# Patient Record
Sex: FEMALE | Race: WHITE
[De-identification: names, ages, dates, MRNs, and addresses within clinical notes are randomized per-mention and may not be internally consistent; named-entity substitution may affect disease eponyms.]

---

## 2019-02-12 ENCOUNTER — HOSPITAL ENCOUNTER (OUTPATIENT)
Dept: HOSPITAL 75 - RAD FS | Age: 69
End: 2019-02-12
Attending: NURSE PRACTITIONER
Payer: MEDICARE

## 2019-02-12 DIAGNOSIS — R10.30: Primary | ICD-10-CM

## 2019-02-12 PROCEDURE — 74176 CT ABD & PELVIS W/O CONTRAST: CPT

## 2019-02-12 NOTE — DIAGNOSTIC IMAGING REPORT
PROCEDURE: CT urinary tract, rule out kidney stone.



TECHNIQUE: Multiple contiguous axial images were obtained through

the abdomen and pelvis without the use of intravenous contrast.



INDICATION:  Lower abdominal pain for three days.



COMPARISON: No prior studies are available for comparison.



FINDINGS: The lung bases are clear. No discrete liver mass is

identified. The gallbladder is unremarkable. No biliary duct

dilatation is seen. The pancreas and spleen are unremarkable. No

adrenal mass is detected. There appears to be a mass involving

the midportion of the left kidney extending exophytically

laterally. This measures approximately 3.6 cm AP x 3.3 cm

cephalocaudal. This is isodense to the renal cortex. No calculi

are seen. There is no hydronephrosis. Aorta is calcified but

non-aneurysmal. No central retroperitoneal or mesenteric

lymphadenopathy is detected. Small and large bowel loops are

normal caliber. There is no ascites apart from small amount of

free fluid in the pelvis surrounding sigmoid bowel loops. There

does appear to be some adjacent inflammatory stranding in the

fat. This may be secondary to diverticulitis. Visualized bladder

is unremarkable.



IMPRESSION:

1. Findings suggestive of acute sigmoid diverticulitis. No

definite abscess formation or bowel obstruction is seen. There is

some free fluid in the pelvis. 

2. Findings suspicious for solid left renal mass. Further

evaluation with renal ultrasound is recommended for better

characterization.









Dictated by: 



  Dictated on workstation # [id number]

## 2019-03-07 ENCOUNTER — HOSPITAL ENCOUNTER (OUTPATIENT)
Dept: HOSPITAL 75 - RAD FS | Age: 69
End: 2019-03-07
Attending: INTERNAL MEDICINE
Payer: MEDICARE

## 2019-03-07 DIAGNOSIS — N28.1: Primary | ICD-10-CM

## 2019-03-07 DIAGNOSIS — I12.9: ICD-10-CM

## 2019-03-07 DIAGNOSIS — N17.9: ICD-10-CM

## 2019-03-07 DIAGNOSIS — N18.4: ICD-10-CM

## 2019-03-07 DIAGNOSIS — E87.5: ICD-10-CM

## 2019-03-07 DIAGNOSIS — Z79.1: ICD-10-CM

## 2019-03-07 DIAGNOSIS — D50.9: ICD-10-CM

## 2019-03-07 DIAGNOSIS — E55.9: ICD-10-CM

## 2019-03-07 PROCEDURE — 76770 US EXAM ABDO BACK WALL COMP: CPT

## 2019-03-07 NOTE — DIAGNOSTIC IMAGING REPORT
PROCEDURE: US Renal Bilateral.



TECHNIQUE: Multiple real-time grayscale images were obtained over

the kidneys in various projections bilaterally.



INDICATION: Left kidney cyst noted on recent CT. Study is

performed for further evaluation.



CORRELATION: Correlation is made with CT study from 02/12/2019.



FINDINGS: Right kidney measures 10.4 x 4.8 x 4.0 cm and the left

kidney measures 9.1 x 5.0 x 5.2 cm. Cortical thickness and

echogenicity is normal. There is a cystic lesion involving the

left kidney measuring 3.3 x 2.5 x 3.9 cm, correlating with the CT

abnormality. This may contain some internal complexity. No

definite internal vascularity is identified. No hydronephrosis is

seen. The bladder is unremarkable.



IMPRESSION: Complex cystic mass in left kidney without evidence

of internal vascularity. This could potentially represent a

hemorrhagic cyst. If no further imaging is performed, short

interval followup is recommended to confirm stability.

Consideration could be given to performance of a CT exam with and

without IV contrast to evaluate for enhancement.



Dictated by: 



  Dictated on workstation # [id number]

## 2019-07-02 ENCOUNTER — HOSPITAL ENCOUNTER (OUTPATIENT)
Dept: HOSPITAL 75 - RAD | Age: 69
End: 2019-07-02
Attending: UROLOGY
Payer: MEDICARE

## 2019-07-02 DIAGNOSIS — I70.0: ICD-10-CM

## 2019-07-02 DIAGNOSIS — Z87.01: ICD-10-CM

## 2019-07-02 DIAGNOSIS — N28.1: Primary | ICD-10-CM

## 2019-07-02 DIAGNOSIS — Z87.448: ICD-10-CM

## 2019-07-02 PROCEDURE — 74150 CT ABDOMEN W/O CONTRAST: CPT

## 2019-07-02 PROCEDURE — 76770 US EXAM ABDO BACK WALL COMP: CPT

## 2019-07-02 NOTE — DIAGNOSTIC IMAGING REPORT
PROCEDURE: CT abdomen without contrast.



TECHNIQUE: Multiple contiguous axial images were obtained through

the abdomen without the use of intravenous contrast. Auto

Exposure Controls were utilized during the CT exam to meet ALARA

standards for radiation dose reduction. 



INDICATION: Dyspnea. History of pneumonia. History of renal cyst.

History of chronic kidney disease.



COMPARISON: 02/12/2019.



FINDINGS: Included portions of the lung bases are clear.



Again identified is partially exophytic hyperdensity extending

from the lateral margins of the mid region of the left kidney. It

measures 2.6 x 3.4 cm. This is slightly smaller in comparison to

2.8 x 3.6 cm on prior exam. No new renal masses are seen on this

noncontrast exam.



The adrenal glands, spleen, pancreas, and liver have an

unremarkable noncontrast CT appearance. Included small bowel

loops are nondistended. Cecum and appendix are not included on

this exam. There is no loculated fluid collection, free fluid,

nor free air within the abdomen. No abnormal mesenteric or

retroperitoneal adenopathy is seen. Note is made of moderate

diffuse calcified aortic and arterial atherosclerosis. Osseous

structures show no acute abnormalities.



IMPRESSION:

1. Slight interval decrease in size of the hyperdense cystic

lesion of the left kidney.

2. Moderate diffuse calcified aortic and arterial

atherosclerosis. Correlation with underlying risk factors is

recommended.



Dictated by: 



  Dictated on workstation # [id number]

## 2019-07-02 NOTE — DIAGNOSTIC IMAGING REPORT
PROCEDURE: US Renal Bilateral.



TECHNIQUE: Multiple real-time grayscale images were obtained over

the kidneys in various projections bilaterally.



INDICATION: Renal cyst.



COMPARISON: Comparison made with prior examination from

03/07/2019.



FINDINGS: The right kidney measures 9.0 x 4.6 x 4.6 cm, and the

left kidney measures 8.7 x 4.1 x 4.1 cm. Both kidneys demonstrate

normal renal cortical thickness and echogenicity. There is a

complex cyst in the left kidney measuring 3.2 cm. There is a

smaller cyst medially measuring 1 cm. There is no hydronephrosis

or calculi. Visualized portions of the bladder are unremarkable.

Both ureteral jets were visualized.



IMPRESSION: Complex cyst in the left kidney, unchanged when

compared to prior examination.



Otherwise, unremarkable renal ultrasound.



Dictated by: 



  Dictated on workstation # [id number]

## 2019-10-02 ENCOUNTER — HOSPITAL ENCOUNTER (OUTPATIENT)
Dept: HOSPITAL 75 - ORTHO | Age: 69
End: 2019-10-02
Attending: ORTHOPAEDIC SURGERY
Payer: COMMERCIAL

## 2019-10-02 DIAGNOSIS — J44.9: ICD-10-CM

## 2019-10-02 DIAGNOSIS — M17.0: Primary | ICD-10-CM

## 2019-12-02 ENCOUNTER — HOSPITAL ENCOUNTER (OUTPATIENT)
Dept: HOSPITAL 75 - RAD | Age: 69
End: 2019-12-02
Attending: INTERNAL MEDICINE
Payer: MEDICARE

## 2019-12-02 DIAGNOSIS — E55.9: ICD-10-CM

## 2019-12-02 DIAGNOSIS — I12.9: Primary | ICD-10-CM

## 2019-12-02 DIAGNOSIS — E87.5: ICD-10-CM

## 2019-12-02 DIAGNOSIS — N17.9: ICD-10-CM

## 2019-12-02 DIAGNOSIS — N28.1: ICD-10-CM

## 2019-12-02 DIAGNOSIS — Z79.1: ICD-10-CM

## 2019-12-02 DIAGNOSIS — N18.4: ICD-10-CM

## 2019-12-02 DIAGNOSIS — D50.9: ICD-10-CM

## 2019-12-02 PROCEDURE — 76770 US EXAM ABDO BACK WALL COMP: CPT

## 2019-12-02 NOTE — DIAGNOSTIC IMAGING REPORT
PROCEDURE: US Renal Bilateral.



TECHNIQUE: Multiple real-time grayscale images were obtained over

the kidneys in various projections bilaterally.



INDICATION: Hypertension and stage III chronic kidney disease.



FINDINGS: Right kidney measures 10.0 x 5.1 x 4.9 cm and the left

kidney measures 8.5 x 4.5 x 4.2 cm. Left kidney does show some

cortical thinning. Cortical thickness on the right is normal. No

calculi or hydronephrosis is seen. There is a 3.5 cm cyst in mid

portion of left kidney. Bladder demonstrates bilateral ureteral

jets.



IMPRESSION: There is some cortical thinning of the left kidney as

well as a 3.5 cm left renal cyst. No calculi or hydronephrosis is

seen.



Dictated by: 



  Dictated on workstation # [id number]

## 2020-06-17 ENCOUNTER — HOSPITAL ENCOUNTER (OUTPATIENT)
Dept: HOSPITAL 75 - RAD | Age: 70
End: 2020-06-17
Attending: UROLOGY
Payer: MEDICARE

## 2020-06-17 DIAGNOSIS — N28.1: Primary | ICD-10-CM

## 2020-06-17 PROCEDURE — 76770 US EXAM ABDO BACK WALL COMP: CPT

## 2020-06-17 NOTE — DIAGNOSTIC IMAGING REPORT
PROCEDURE: US Renal Bilateral.



TECHNIQUE: Multiple real-time grayscale images were obtained over

the kidneys in various projections bilaterally.



INDICATION: Left renal cyst.



COMPARISON: 12/02/2019, 07/02/2019, 03/07/2019.



FINDINGS:



The right kidney measures 9.8 cm in length. There is no

hydronephrosis. No masses are seen. The renal cortex is mildly

echogenic.



The left kidney measures 8.8 cm in length. There is no

hydronephrosis. There is a 3.4 cm cyst at the superior left

kidney which appears stable in size compared to prior exams. No

solid masses are seen. The renal cortex is mildly echogenic.



Bilateral ureteral jets are seen. No filling defects are seen in

the bladder.



IMPRESSION:

1. Stable simple-appearing left renal cyst.

2. Echogenic renal cortices bilaterally, may be due to medical

renal disease.



Dictated by: 



  Dictated on workstation # [id number]

## 2020-08-31 ENCOUNTER — HOSPITAL ENCOUNTER (OUTPATIENT)
Dept: HOSPITAL 75 - ORTHO | Age: 70
End: 2020-08-31
Attending: ORTHOPAEDIC SURGERY
Payer: MEDICARE

## 2020-08-31 DIAGNOSIS — Z53.9: Primary | ICD-10-CM

## 2020-12-07 ENCOUNTER — HOSPITAL ENCOUNTER (OUTPATIENT)
Dept: HOSPITAL 75 - ORTHO | Age: 70
End: 2020-12-07
Attending: ORTHOPAEDIC SURGERY
Payer: COMMERCIAL

## 2020-12-07 DIAGNOSIS — M17.12: ICD-10-CM

## 2020-12-07 DIAGNOSIS — M17.11: Primary | ICD-10-CM

## 2020-12-07 PROCEDURE — 20610 DRAIN/INJ JOINT/BURSA W/O US: CPT

## 2021-01-25 ENCOUNTER — HOSPITAL ENCOUNTER (OUTPATIENT)
Dept: HOSPITAL 75 - RAD | Age: 71
End: 2021-01-25
Attending: UROLOGY
Payer: MEDICARE

## 2021-01-25 DIAGNOSIS — N28.1: Primary | ICD-10-CM

## 2021-01-25 PROCEDURE — 76770 US EXAM ABDO BACK WALL COMP: CPT

## 2021-01-25 NOTE — DIAGNOSTIC IMAGING REPORT
PROCEDURE: US Renal Bilateral.



TECHNIQUE: Multiple real-time grayscale images were obtained over

the kidneys in various projections bilaterally.



INDICATION: Left renal cyst, follow-up.



COMPARISON: Correlation is made with prior ultrasound from

06/17/2020.



FINDINGS: Right kidney measures 10.1 x 5.1 x 4.6 cm and left

kidney measures 10.5 x 4.3 x 4.8 cm. Cortical thickness and

echogenicity is normal. Cyst in the left kidney measures 3.3 x

2.7 x 2.2 cm, similar to prior exam. No solid component is

identified. No other renal masses are seen. There are no calculi

or hydronephrosis. Bilateral ureteral jets were visualized within

the bladder.



IMPRESSION: Stable simple-appearing cyst in the left kidney when

compared with exam from 06/17/2020.



Dictated by: 



  Dictated on workstation # [id number]